# Patient Record
Sex: FEMALE | Race: WHITE | Employment: PART TIME | URBAN - METROPOLITAN AREA
[De-identification: names, ages, dates, MRNs, and addresses within clinical notes are randomized per-mention and may not be internally consistent; named-entity substitution may affect disease eponyms.]

---

## 2017-01-23 PROBLEM — E11.9 TYPE 2 DIABETES MELLITUS WITHOUT COMPLICATION, WITH LONG-TERM CURRENT USE OF INSULIN (HCC): Status: ACTIVE | Noted: 2017-01-23

## 2017-01-23 PROBLEM — Z79.4 TYPE 2 DIABETES MELLITUS WITHOUT COMPLICATION, WITH LONG-TERM CURRENT USE OF INSULIN (HCC): Status: ACTIVE | Noted: 2017-01-23

## 2019-03-13 ENCOUNTER — HOSPITAL ENCOUNTER (INPATIENT)
Facility: HOSPITAL | Age: 54
LOS: 2 days | Discharge: HOME | DRG: 065 | End: 2019-03-15
Attending: EMERGENCY MEDICINE | Admitting: HOSPITALIST
Payer: COMMERCIAL

## 2019-03-13 ENCOUNTER — APPOINTMENT (OUTPATIENT)
Dept: MRI IMAGING | Facility: HOSPITAL | Age: 54
DRG: 065 | End: 2019-03-13
Attending: NURSE PRACTITIONER
Payer: COMMERCIAL

## 2019-03-13 ENCOUNTER — APPOINTMENT (OUTPATIENT)
Dept: CT IMAGING | Age: 54
DRG: 065 | End: 2019-03-13
Attending: EMERGENCY MEDICINE
Payer: COMMERCIAL

## 2019-03-13 ENCOUNTER — APPOINTMENT (OUTPATIENT)
Dept: GENERAL RADIOLOGY | Age: 54
DRG: 065 | End: 2019-03-13
Attending: EMERGENCY MEDICINE
Payer: COMMERCIAL

## 2019-03-13 DIAGNOSIS — I63.9 ACUTE CVA (CEREBROVASCULAR ACCIDENT) (HCC): Primary | ICD-10-CM

## 2019-03-13 PROBLEM — E11.8 TYPE 2 DIABETES MELLITUS WITH COMPLICATION, WITHOUT LONG-TERM CURRENT USE OF INSULIN (HCC): Status: ACTIVE | Noted: 2017-01-23

## 2019-03-13 LAB
ALBUMIN SERPL-MCNC: 4.6 G/DL (ref 3.4–5)
ALBUMIN/GLOB SERPL: 1.2 {RATIO} (ref 1–2)
ALP LIVER SERPL-CCNC: 66 U/L (ref 41–108)
ALT SERPL-CCNC: 25 U/L (ref 13–56)
ANION GAP SERPL CALC-SCNC: 10 MMOL/L (ref 0–18)
AST SERPL-CCNC: 13 U/L (ref 15–37)
BASOPHILS # BLD AUTO: 0.04 X10(3) UL (ref 0–0.2)
BASOPHILS NFR BLD AUTO: 0.4 %
BILIRUB SERPL-MCNC: 1.1 MG/DL (ref 0.1–2)
BILIRUB UR QL STRIP.AUTO: NEGATIVE
BUN BLD-MCNC: 9 MG/DL (ref 7–18)
BUN/CREAT SERPL: 12.3 (ref 10–20)
CALCIUM BLD-MCNC: 9.7 MG/DL (ref 8.5–10.1)
CHLORIDE SERPL-SCNC: 104 MMOL/L (ref 98–107)
CHOLEST SMN-MCNC: 194 MG/DL (ref ?–200)
CLARITY UR REFRACT.AUTO: CLEAR
CO2 SERPL-SCNC: 25 MMOL/L (ref 21–32)
CREAT BLD-MCNC: 0.73 MG/DL (ref 0.55–1.02)
DEPRECATED RDW RBC AUTO: 42 FL (ref 35.1–46.3)
EOSINOPHIL # BLD AUTO: 0.07 X10(3) UL (ref 0–0.7)
EOSINOPHIL NFR BLD AUTO: 0.7 %
ERYTHROCYTE [DISTWIDTH] IN BLOOD BY AUTOMATED COUNT: 12.9 % (ref 11–15)
EST. AVERAGE GLUCOSE BLD GHB EST-MCNC: 140 MG/DL (ref 68–126)
GLOBULIN PLAS-MCNC: 3.9 G/DL (ref 2.8–4.4)
GLUCOSE BLD-MCNC: 147 MG/DL (ref 70–99)
GLUCOSE BLD-MCNC: 148 MG/DL (ref 70–99)
GLUCOSE UR STRIP.AUTO-MCNC: NEGATIVE MG/DL
HBA1C MFR BLD HPLC: 6.5 % (ref ?–5.7)
HCT VFR BLD AUTO: 49.2 % (ref 35–48)
HDLC SERPL-MCNC: 47 MG/DL (ref 40–59)
HGB BLD-MCNC: 16.2 G/DL (ref 12–16)
IMM GRANULOCYTES # BLD AUTO: 0.03 X10(3) UL (ref 0–1)
IMM GRANULOCYTES NFR BLD: 0.3 %
INR BLD: 0.97 (ref 0.9–1.1)
LDLC SERPL CALC-MCNC: 111 MG/DL (ref ?–100)
LYMPHOCYTES # BLD AUTO: 2.2 X10(3) UL (ref 1–4)
LYMPHOCYTES NFR BLD AUTO: 22.5 %
M PROTEIN MFR SERPL ELPH: 8.5 G/DL (ref 6.4–8.2)
MCH RBC QN AUTO: 29.3 PG (ref 26–34)
MCHC RBC AUTO-ENTMCNC: 32.9 G/DL (ref 31–37)
MCV RBC AUTO: 89.1 FL (ref 80–100)
MONOCYTES # BLD AUTO: 0.62 X10(3) UL (ref 0.1–1)
MONOCYTES NFR BLD AUTO: 6.3 %
NEUTROPHILS # BLD AUTO: 6.82 X10 (3) UL (ref 1.5–7.7)
NEUTROPHILS # BLD AUTO: 6.82 X10(3) UL (ref 1.5–7.7)
NEUTROPHILS NFR BLD AUTO: 69.8 %
NITRITE UR QL STRIP.AUTO: NEGATIVE
NONHDLC SERPL-MCNC: 147 MG/DL (ref ?–130)
OSMOLALITY SERPL CALC.SUM OF ELEC: 289 MOSM/KG (ref 275–295)
PH UR STRIP.AUTO: 6.5 [PH] (ref 4.5–8)
PLATELET # BLD AUTO: 196 10(3)UL (ref 150–450)
POTASSIUM SERPL-SCNC: 4 MMOL/L (ref 3.5–5.1)
PROT UR STRIP.AUTO-MCNC: NEGATIVE MG/DL
PSA SERPL DL<=0.01 NG/ML-MCNC: 13 SECONDS (ref 12.2–14.4)
RBC # BLD AUTO: 5.52 X10(6)UL (ref 3.8–5.3)
RBC UR QL AUTO: NEGATIVE
SODIUM SERPL-SCNC: 139 MMOL/L (ref 136–145)
SP GR UR STRIP.AUTO: <=1.005 (ref 1–1.03)
TRIGL SERPL-MCNC: 182 MG/DL (ref 30–149)
TROPONIN I SERPL-MCNC: <0.045 NG/ML (ref ?–0.04)
UROBILINOGEN UR STRIP.AUTO-MCNC: 0.2 MG/DL
VLDLC SERPL CALC-MCNC: 36 MG/DL (ref 0–30)
WBC # BLD AUTO: 9.8 X10(3) UL (ref 4–11)

## 2019-03-13 PROCEDURE — 71045 X-RAY EXAM CHEST 1 VIEW: CPT | Performed by: EMERGENCY MEDICINE

## 2019-03-13 PROCEDURE — 99291 CRITICAL CARE FIRST HOUR: CPT | Performed by: NURSE PRACTITIONER

## 2019-03-13 PROCEDURE — 70450 CT HEAD/BRAIN W/O DYE: CPT | Performed by: EMERGENCY MEDICINE

## 2019-03-13 PROCEDURE — 76498 UNLISTED MR PROCEDURE: CPT | Performed by: NURSE PRACTITIONER

## 2019-03-13 PROCEDURE — 70498 CT ANGIOGRAPHY NECK: CPT | Performed by: EMERGENCY MEDICINE

## 2019-03-13 PROCEDURE — 70496 CT ANGIOGRAPHY HEAD: CPT | Performed by: EMERGENCY MEDICINE

## 2019-03-13 PROCEDURE — 70551 MRI BRAIN STEM W/O DYE: CPT | Performed by: NURSE PRACTITIONER

## 2019-03-13 RX ORDER — ATORVASTATIN CALCIUM 40 MG/1
40 TABLET, FILM COATED ORAL NIGHTLY
Status: DISCONTINUED | OUTPATIENT
Start: 2019-03-14 | End: 2019-03-15

## 2019-03-13 RX ORDER — SODIUM CHLORIDE 9 MG/ML
INJECTION, SOLUTION INTRAVENOUS CONTINUOUS
Status: DISCONTINUED | OUTPATIENT
Start: 2019-03-13 | End: 2019-03-13

## 2019-03-13 RX ORDER — ASPIRIN 81 MG/1
324 TABLET, CHEWABLE ORAL ONCE
Status: COMPLETED | OUTPATIENT
Start: 2019-03-13 | End: 2019-03-13

## 2019-03-13 RX ORDER — METOCLOPRAMIDE HYDROCHLORIDE 5 MG/ML
10 INJECTION INTRAMUSCULAR; INTRAVENOUS EVERY 8 HOURS PRN
Status: DISCONTINUED | OUTPATIENT
Start: 2019-03-13 | End: 2019-03-15

## 2019-03-13 RX ORDER — PHENYLEPHRINE HCL IN 0.9% NACL 50MG/250ML
PLASTIC BAG, INJECTION (ML) INTRAVENOUS CONTINUOUS PRN
Status: DISCONTINUED | OUTPATIENT
Start: 2019-03-13 | End: 2019-03-15

## 2019-03-13 RX ORDER — DEXTROSE MONOHYDRATE 25 G/50ML
50 INJECTION, SOLUTION INTRAVENOUS
Status: DISCONTINUED | OUTPATIENT
Start: 2019-03-13 | End: 2019-03-15

## 2019-03-13 RX ORDER — ACETAMINOPHEN 325 MG/1
650 TABLET ORAL EVERY 4 HOURS PRN
Status: DISCONTINUED | OUTPATIENT
Start: 2019-03-13 | End: 2019-03-15

## 2019-03-13 RX ORDER — SODIUM PHOSPHATE, DIBASIC AND SODIUM PHOSPHATE, MONOBASIC 7; 19 G/133ML; G/133ML
1 ENEMA RECTAL ONCE AS NEEDED
Status: DISCONTINUED | OUTPATIENT
Start: 2019-03-13 | End: 2019-03-15

## 2019-03-13 RX ORDER — SENNOSIDES 8.6 MG
17.2 TABLET ORAL NIGHTLY
Status: DISCONTINUED | OUTPATIENT
Start: 2019-03-13 | End: 2019-03-15

## 2019-03-13 RX ORDER — ASPIRIN 81 MG/1
324 TABLET, CHEWABLE ORAL DAILY
Status: DISCONTINUED | OUTPATIENT
Start: 2019-03-14 | End: 2019-03-15

## 2019-03-13 RX ORDER — LORAZEPAM 2 MG/ML
0.5 INJECTION INTRAMUSCULAR ONCE
Status: COMPLETED | OUTPATIENT
Start: 2019-03-13 | End: 2019-03-13

## 2019-03-13 RX ORDER — ONDANSETRON 2 MG/ML
4 INJECTION INTRAMUSCULAR; INTRAVENOUS EVERY 6 HOURS PRN
Status: DISCONTINUED | OUTPATIENT
Start: 2019-03-13 | End: 2019-03-15

## 2019-03-13 RX ORDER — DOCUSATE SODIUM 100 MG/1
100 CAPSULE, LIQUID FILLED ORAL 2 TIMES DAILY
Status: DISCONTINUED | OUTPATIENT
Start: 2019-03-13 | End: 2019-03-15

## 2019-03-13 RX ORDER — FAMOTIDINE 20 MG/1
20 TABLET ORAL 2 TIMES DAILY
Status: DISCONTINUED | OUTPATIENT
Start: 2019-03-13 | End: 2019-03-14

## 2019-03-13 RX ORDER — BISACODYL 10 MG
10 SUPPOSITORY, RECTAL RECTAL
Status: DISCONTINUED | OUTPATIENT
Start: 2019-03-13 | End: 2019-03-15

## 2019-03-13 RX ORDER — ACETAMINOPHEN 650 MG/1
650 SUPPOSITORY RECTAL EVERY 4 HOURS PRN
Status: DISCONTINUED | OUTPATIENT
Start: 2019-03-13 | End: 2019-03-15

## 2019-03-13 RX ORDER — NICOTINE 21 MG/24HR
1 PATCH, TRANSDERMAL 24 HOURS TRANSDERMAL DAILY
Status: DISCONTINUED | OUTPATIENT
Start: 2019-03-13 | End: 2019-03-15

## 2019-03-13 RX ORDER — SODIUM CHLORIDE 9 MG/ML
INJECTION, SOLUTION INTRAVENOUS CONTINUOUS
Status: DISCONTINUED | OUTPATIENT
Start: 2019-03-13 | End: 2019-03-14

## 2019-03-13 RX ORDER — MORPHINE SULFATE 4 MG/ML
2 INJECTION, SOLUTION INTRAMUSCULAR; INTRAVENOUS EVERY 2 HOUR PRN
Status: DISCONTINUED | OUTPATIENT
Start: 2019-03-13 | End: 2019-03-15

## 2019-03-13 RX ORDER — ASPIRIN 81 MG/1
324 TABLET, CHEWABLE ORAL DAILY
Status: DISCONTINUED | OUTPATIENT
Start: 2019-03-14 | End: 2019-03-13

## 2019-03-13 RX ORDER — FAMOTIDINE 10 MG/ML
20 INJECTION, SOLUTION INTRAVENOUS 2 TIMES DAILY
Status: DISCONTINUED | OUTPATIENT
Start: 2019-03-13 | End: 2019-03-14

## 2019-03-13 RX ORDER — LABETALOL HYDROCHLORIDE 5 MG/ML
10 INJECTION, SOLUTION INTRAVENOUS ONCE
Status: COMPLETED | OUTPATIENT
Start: 2019-03-13 | End: 2019-03-13

## 2019-03-13 RX ORDER — MORPHINE SULFATE 4 MG/ML
1 INJECTION, SOLUTION INTRAMUSCULAR; INTRAVENOUS EVERY 2 HOUR PRN
Status: DISCONTINUED | OUTPATIENT
Start: 2019-03-13 | End: 2019-03-15

## 2019-03-13 RX ORDER — LABETALOL HYDROCHLORIDE 5 MG/ML
10 INJECTION, SOLUTION INTRAVENOUS EVERY 10 MIN PRN
Status: DISCONTINUED | OUTPATIENT
Start: 2019-03-13 | End: 2019-03-15

## 2019-03-13 RX ORDER — POLYETHYLENE GLYCOL 3350 17 G/17G
17 POWDER, FOR SOLUTION ORAL DAILY PRN
Status: DISCONTINUED | OUTPATIENT
Start: 2019-03-13 | End: 2019-03-15

## 2019-03-13 NOTE — ED PROVIDER NOTES
Patient transferred from [place] ED, where she presented as a stroke alert. However, the exact last normal time is unclear. Patient reports she has been having paresthesias in her right index, middle, ring fingers for about the last 2 weeks.   She has patent with flow appreciated in the left anterior cerebral, middle cerebral artery and posterior cerebral arteries. 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   Critical exam results phoned to Dr. Garima Maldonado on 3/13/2019 at 1740 hr with read

## 2019-03-13 NOTE — ED INITIAL ASSESSMENT (HPI)
C/o right hand numbness x2 weeks, right facial droop since approx 2pm   Patient daughter noted unsteady gait. Patient states she feels fine.  Denies dizziness

## 2019-03-13 NOTE — ED NOTES
Patient has arrived to [facility] ED at this time from [place] by EMS. Patient requesting to go to bathroom and and assisted. Ambulatory with assistance. Alert and appropriate.  Patient continues to have mild facial droop to right side with slight

## 2019-03-14 ENCOUNTER — APPOINTMENT (OUTPATIENT)
Dept: CV DIAGNOSTICS | Facility: HOSPITAL | Age: 54
DRG: 065 | End: 2019-03-14
Attending: NURSE PRACTITIONER
Payer: COMMERCIAL

## 2019-03-14 LAB
ANION GAP SERPL CALC-SCNC: 8 MMOL/L (ref 0–18)
APTT PPP: 28.1 SECONDS (ref 26.1–34.6)
ATRIAL RATE: 141 BPM
BASOPHILS # BLD AUTO: 0.03 X10(3) UL (ref 0–0.2)
BASOPHILS NFR BLD AUTO: 0.4 %
BUN BLD-MCNC: 7 MG/DL (ref 7–18)
BUN/CREAT SERPL: 14 (ref 10–20)
CALCIUM BLD-MCNC: 8.2 MG/DL (ref 8.5–10.1)
CHLORIDE SERPL-SCNC: 111 MMOL/L (ref 98–107)
CO2 SERPL-SCNC: 25 MMOL/L (ref 21–32)
CREAT BLD-MCNC: 0.5 MG/DL (ref 0.55–1.02)
DEPRECATED RDW RBC AUTO: 41.4 FL (ref 35.1–46.3)
EOSINOPHIL # BLD AUTO: 0.12 X10(3) UL (ref 0–0.7)
EOSINOPHIL NFR BLD AUTO: 1.6 %
ERYTHROCYTE [DISTWIDTH] IN BLOOD BY AUTOMATED COUNT: 12.7 % (ref 11–15)
GLUCOSE BLD-MCNC: 126 MG/DL (ref 70–99)
GLUCOSE BLD-MCNC: 131 MG/DL (ref 70–99)
GLUCOSE BLD-MCNC: 133 MG/DL (ref 70–99)
GLUCOSE BLD-MCNC: 134 MG/DL (ref 70–99)
GLUCOSE BLD-MCNC: 143 MG/DL (ref 70–99)
GLUCOSE BLD-MCNC: 150 MG/DL (ref 70–99)
HAV IGM SER QL: 1.9 MG/DL (ref 1.6–2.6)
HCT VFR BLD AUTO: 41.8 % (ref 35–48)
HGB BLD-MCNC: 14.3 G/DL (ref 12–16)
IMM GRANULOCYTES # BLD AUTO: 0.01 X10(3) UL (ref 0–1)
IMM GRANULOCYTES NFR BLD: 0.1 %
INR BLD: 1.06 (ref 0.9–1.1)
LYMPHOCYTES # BLD AUTO: 2.08 X10(3) UL (ref 1–4)
LYMPHOCYTES NFR BLD AUTO: 28.2 %
MCH RBC QN AUTO: 30.2 PG (ref 26–34)
MCHC RBC AUTO-ENTMCNC: 34.2 G/DL (ref 31–37)
MCV RBC AUTO: 88.2 FL (ref 80–100)
MONOCYTES # BLD AUTO: 0.53 X10(3) UL (ref 0.1–1)
MONOCYTES NFR BLD AUTO: 7.2 %
NEUTROPHILS # BLD AUTO: 4.6 X10 (3) UL (ref 1.5–7.7)
NEUTROPHILS # BLD AUTO: 4.6 X10(3) UL (ref 1.5–7.7)
NEUTROPHILS NFR BLD AUTO: 62.5 %
OSMOLALITY SERPL CALC.SUM OF ELEC: 298 MOSM/KG (ref 275–295)
P AXIS: 62 DEGREES
P-R INTERVAL: 136 MS
PHOSPHATE SERPL-MCNC: 3.6 MG/DL (ref 2.5–4.9)
PLATELET # BLD AUTO: 135 10(3)UL (ref 150–450)
POTASSIUM SERPL-SCNC: 3.6 MMOL/L (ref 3.5–5.1)
PSA SERPL DL<=0.01 NG/ML-MCNC: 14.3 SECONDS (ref 12.5–14.7)
Q-T INTERVAL: 282 MS
QRS DURATION: 78 MS
QTC CALCULATION (BEZET): 431 MS
R AXIS: 64 DEGREES
RBC # BLD AUTO: 4.74 X10(6)UL (ref 3.8–5.3)
SODIUM SERPL-SCNC: 144 MMOL/L (ref 136–145)
T AXIS: 30 DEGREES
VENTRICULAR RATE: 141 BPM
WBC # BLD AUTO: 7.4 X10(3) UL (ref 4–11)

## 2019-03-14 PROCEDURE — 93306 TTE W/DOPPLER COMPLETE: CPT | Performed by: NURSE PRACTITIONER

## 2019-03-14 PROCEDURE — 99291 CRITICAL CARE FIRST HOUR: CPT | Performed by: INTERNAL MEDICINE

## 2019-03-14 RX ORDER — BUSPIRONE HYDROCHLORIDE 5 MG/1
10 TABLET ORAL DAILY
Status: DISCONTINUED | OUTPATIENT
Start: 2019-03-14 | End: 2019-03-15

## 2019-03-14 RX ORDER — MAGNESIUM OXIDE 400 MG (241.3 MG MAGNESIUM) TABLET
3 TABLET NIGHTLY PRN
Status: DISCONTINUED | OUTPATIENT
Start: 2019-03-14 | End: 2019-03-15

## 2019-03-14 RX ORDER — ENOXAPARIN SODIUM 100 MG/ML
40 INJECTION SUBCUTANEOUS DAILY
Status: DISCONTINUED | OUTPATIENT
Start: 2019-03-14 | End: 2019-03-15

## 2019-03-14 NOTE — ED NOTES
Per Dr. Delphine Staples, instructed to administer oral aspirin dose despite slight facial droop and slight slurred speech. Patient reports she has been tolerating oral fluids since symptoms developed earlier this afternoon.  MD informed that dysphagia screen is aut

## 2019-03-14 NOTE — OCCUPATIONAL THERAPY NOTE
OCCUPATIONAL THERAPY QUICK EVALUATION - INPATIENT    Room Number: 2400/2139-P  Evaluation Date: 3/14/2019     Type of Evaluation: Initial and Quick Eval  Presenting Problem: CVA    Physician Order: IP Consult to Occupational Therapy  Reason for Therapy:  A granddaughter. Pt began experiencing RUE symptoms 2 weeks ago and thought she was having carpal tunnel issues from repetitive motion at work. Pt has been completing ADL with mod (I) with increased time or use of non-dominant LUE to complete.  Pt has had dif None    AM-PAC Score:  Score: 24  Approx Degree of Impairment: 0%  Standardized Score (AM-PAC Scale): 57.54  CMS Modifier (G-Code): CH    FUNCTIONAL TRANSFER ASSESSMENT  Supine to Sit : Independent  Sit to Stand: Independent    Skilled Therapy Provided: Pt dictates social work to be placed, this order has been placed. Results of the Sumeet 9 Hole Peg Test are 3 min and 7 seconds on Right and 31 seconds on Left. This patient is right hand dominant.  On three pegs via RUE pt utilized LUE to place peg in right following goals:  Patient able to toilet transfer: safely and independently  Patient/Caregiver able to demonstrate safety with ADLS: safely and independently

## 2019-03-14 NOTE — PROGRESS NOTES
03/14/19 0929   Clinical Encounter Type   Visited With Patient and family together   Sacramental Encounters   Sacrament of Sick-Anointing Anointed   The patient was seen by Duncan Lucas.  Received prayer, Scripture, support and Sacrament of t

## 2019-03-14 NOTE — PLAN OF CARE
Assume care 0730. Echo completed at bedside. A/ox4, has some very mild right facial droop, slight slurring with certain movements. Denies HA currently has mild one this morning. C/o N/T right fingers and fine motor skills slow.  Slight inward rotatio

## 2019-03-14 NOTE — PROGRESS NOTES
[employer]  Neurocritical Care APRN Progress Note    NAME: Eduardo Abdi - ROOM: 2844/0260-M - MRN: [id number] - Age: 53year old - :1965    History Of Present Illness:  Lucretia Avalos is a 53year old female with PMHx significa A comprehensive 10 point review of systems was completed. Pertinent positives and negatives noted in the HPI.       OBJECTIVE  Vitals:  /70   Pulse 92   Temp 98 °F (36.7 °C) (Temporal)   Resp 21   Ht 170.2 cm (5' 7\")   Wt 160 lb (72.6 kg)   SpO2 93% CONCLUSION:  No acute intracranial hemorrhage or localized mass effect. There is non-specific patchy decreased attenuation in the periventricular and subcortical white matter of the left cerebral hemisphere.   This may represent chronic small vessel ischem CONCLUSION:  No appreciable flow involving the proximal, mid and distal left internal carotid consistent with occlusion.   The left anterior communicating artery and left posterior communicating arteries are patent with flow appreciated in the left anterior Proph: -SCD  -Pepcid    Dispo:   -Full code    Plan of care discussed with Aguila Jiang, Dr. Austin Christianson. Plan of care also discussed with Neuro-Interventionalist, Dr. Burak Meyer.           MICHAEL Lemons  Critical Care Nurse Practitioner  Spectr

## 2019-03-14 NOTE — H&P
[facility] Hospitalist H&P       CC: Patient presents with:  Stroke (neurologic)       PCP: Shannon Low MD    History of Present Illness: Patient is a 53year old female with PMH sig for DM not on meds and current smoker is admitted with complaint of numbnes Alcohol/week: 5.4 oz      Types: 9 Standard drinks or equivalent per week      Comment: 3 VODKA DRINKS 3 DAYS PER WEEK       Fam Hx  Family History   Problem Relation Age of Onset   • Cancer Father         throat   • Other (Other) Mother         hypoglycem 3.6       Recent Labs   Lab  03/13/19   1650   ALT  25   AST  13*   ALB  4.6       Recent Labs   Lab  03/13/19   1650   TROP  <0.045       Additional Diagnostics: ECG: ST with PVCs    CXR: image personally reviewed normal    Radiology: Xr Chest Ap Portable No depressed calvarial fracture. CONCLUSION:  No acute intracranial hemorrhage or localized mass effect.   There is non-specific patchy decreased attenuation in the periventricular and subcortical white matter of the left cerebral hemispher demonstrates no significant difference in cerebral blood flow and cerebral blood volume mapping of the left cerebral hemisphere.   There is a large area of perfusion mismatch involving the left cerebral hemisphere on TTP and Tmax color mapping when correlat proximal left internal carotid to the MELISSA/MCA bifurcation. The anterior communicating artery is patent. There is diminutive flow in the left A1 branch of the anterior cerebral artery. There is flow in the left and right M1 and M2 branches.   Posterior co Genaro Eubanks MD               ASSESSMENT / PLAN:     Acute CVA  -stroke order set  -neuro/neuro crit care managing  -ASA, statin initiated (, TG already elevated, goal < 70 per neuro)  -permissive HTN pending further neuro recs - will need bp med

## 2019-03-14 NOTE — PROGRESS NOTES
NURSING ADMISSION NOTE      Patient admitted via Cart  Oriented to room. Safety precautions initiated. Bed in low position. Call light in reach.     Admission database completed   NIH 4 upon arrival  Patient has slurred speech, facial droop, and numb

## 2019-03-14 NOTE — ED NOTES
Report to Milena MARI at this time. Patient and family remain updated with results and plan for admission. Patient alert and appropriate. No distress observed. BP greatly improved.

## 2019-03-14 NOTE — CM/SW NOTE
03/14/19 1200   CM/SW Referral Data   Referral Source Physician   Reason for Referral Discharge planning;Protocol order set   Specify order set Stroke   Informant Patient; Children;Friend   Patient Info   Patient's Mental Status Alert;Oriented   Patient

## 2019-03-14 NOTE — PAYOR COMM NOTE
--------------  ADMISSION REVIEW     Payor: [address]  Subscriber #:  [id number]  Authorization Number: Pend    Admit date: 3/13/19  Admit time: 2105       Admitting Physician: Mayelin Lagos MD  Attending Physician:  Edwardo Lassiter, infarction cannot entirely be excluded in the appropriate clinical setting. MRI would be helpful for further characterization if clinical concern persists. Critical exam results phoned to Dr. Srinivas Miranda on 3/13/2019 at 1720 hr with read back.    Dictated by: Alla Prescott DO (Physician)           [facility] Hospitalist H&P       CC: Patient presents with:  Stroke (neurologic)       PCP: Elizabeth More MD    History of Present Illness: Patient is a 53year old female with PMH sig for DM not on meds and current smoker is admi lymph adenopathy, thyroid: no enlargment/tenderness/nodules appreciated   Lungs:   Clear to auscultation bilaterally. Normal effort   Chest wall:  No tenderness or deformity.    Heart:  Regular rate and rhythm, S1, S2 normal, no murmur, rub or gallop apprec (CPT=70450)  COMPARISON:  None. INDICATIONS:  numbness to left hand/left facial droop started today. TECHNIQUE:  Noncontrast CT scanning is performed through the brain. Dose reduction techniques were used.  Dose information is transmitted to the ACR (Radha brain including diffusion imaging and 3D time-of-flight MRA and perfusion imaging was performed following administration of 15 cc Dotarem.    FINDINGS:  Multiple foci of restricted diffusion are present throughout the left frontal and parietal lobes and pos Ct Stroke Cta Brain/cta Neck (w Iv)(cpt=70496/18993)    Result Date: 3/13/2019  PROCEDURE:  CT STROKE CTA BRAIN/CTA NECK (W IV)(CPT=70496/29823)  COMPARISON:  [facility], CT STROKE BRAIN (NO IV)(CPT=70450), 3/13/2019, 17:12.   INDICATIONS:  numbness to relation to the right with hypoplastic V4 segment  RIGHT INTERNAL CAROTID:   There is mild calcific plaque involving the proximal right internal carotid artery.   No significant stenosis or dissection COMMON CAROTID:   No significant stenosis or dissection Service number: [phone]        Electronically signed by Pierce Denver, DO on 3/14/2019  8:44 AM         MEDICATIONS ADMINISTERED IN LAST 1 DAY:  aspirin chewable tab 324 mg     Date Action Dose Route User    3/13/2019 1854 Given 324 mg Oral Norma, Aria

## 2019-03-14 NOTE — CONSULTS
[facility]  Neurocritical Care Consult Note    María Florinda Abdi Patient Status:  Inpatient    1965 MRN [id number]   [facility] 6NE-A Attending Nicholas Crouch,    Hosp Day # 1 PCP Kameron Alvarenga MD       Reason for Co Age of Onset   • Cancer Father         throat   • Other (Other) Mother         hypoglycemic, stroke       Current Meds:    Current Facility-Administered Medications:  busPIRone HCl (BUSPAR) tab 10 mg 10 mg Oral Daily   Enoxaparin Sodium (LOVENOX) 40 MG/0.4 times per day   nicotine (NICODERM CQ) 21 MG/24HR 1 patch 1 patch Transdermal Daily   atorvastatin (LIPITOR) tab 40 mg 40 mg Oral Nightly       Review of Systems:     Constitutional:    Denies unusual weight loss or weight gain, fever/chills or night sweat in the periventricular and subcortical white matter of the left cerebral hemisphere. This may represent chronic small vessel ischemic changes or demyelinating process. Acute infarction cannot entirely be excluded in the appropriate clinical setting.   MRI 03/13/19   1650  03/14/19   0420   NA  139  144   K  4.0  3.6   CL  104  111*   CO2  25.0  25.0   GLU  148*  134*   BUN  9  7   CREATSERUM  0.73  0.50*     Recent Labs   Lab  03/13/19   1650  03/14/19   0420   WBC  9.8  7.4   HGB  16.2*  14.3   PLT  196.0

## 2019-03-14 NOTE — PHYSICAL THERAPY NOTE
PHYSICAL THERAPY EVALUATION - INPATIENT     Room Number: 4309/3442-D  Evaluation Date: 3/14/2019  Type of Evaluation: Initial  Physician Order: PT Eval and Treat    Presenting Problem: Acute CVA, 3/13/19  Reason for Therapy: Mobility Dysfunction and Sitting: Fair +  Static Standing: Fair +  Dynamic Standing: Poor +    ADDITIONAL TESTS                                    NEUROLOGICAL FINDINGS                      ACTIVITY TOLERANCE           BP: 156/85  BP Location: Right arm     Patient Position: Sitti to room for another 100 feet with CGA for safety. Pt returned to [facility] with [address] [facility] staff. Exercise/Education Provided:  Functional activity tolerated  Gait training  Transfer training    Patient End of Session: Up in chair; With [address] staff;Needs met;Call light

## 2019-03-14 NOTE — SLP NOTE
ADULT SWALLOWING EVALUATION    ASSESSMENT    ASSESSMENT/OVERALL IMPRESSION:  Patient seen for swallowing assessment per stroke protocol. She did not pass RN dysphagia screen due to right facial droop. She is alert and eager to eat.   She reports her speec Regular; Thin liquids  Precautions: Aspiration    Patient/Family Goals:  To get better    SWALLOWING HISTORY  Current Diet Consistency: NPO  Dysphagia History: denied  Imaging Results:   MRI Brain from 3/13/19 revealed:  CONCLUSION:  Multi focal punctate are #2 The patient/family/caregiver will demonstrate understanding and implementation of aspiration precautions and swallow strategies independently over 1-2 session(s).     In Progress   Goal #3 Patient will participate in cognitive communication assessment  I

## 2019-03-14 NOTE — CONSULTS
[facility] Interventional Neuro Radiology Consult    Emigdio Abdi Patient Status:  Inpatient    1965 MRN [id number]   [facility] 6NE-A Attending Sheron Graves,    Hosp Day # 1 PCP Bryan Potter MD     [address]  with ambulation, coordination, or speech expression/comprehension.      The patient denies constitutional symptoms, such as fevers, chills, night sweats, weight loss, chest pain, shortness of breath, gastrointestinal or genitourinary symptoms    PAST MEDICA hydroxide (MILK OF MAGNESIA) 400 MG/5ML suspension 30 mL 30 mL Oral Daily PRN   bisacodyl (DULCOLAX) rectal suppository 10 mg 10 mg Rectal Daily PRN   FLEET ENEMA (FLEET) 7-19 GM/118ML enema 133 mL 1 enema Rectal Once PRN   ondansetron HCl (ZOFRAN) injecti is intact. Gait deferred. DIAGNOSTIC DATA: Lab Results   Component Value Date    WBC 7.4 03/14/2019    HGB 14.3 03/14/2019    HCT 41.8 03/14/2019    .0 03/14/2019    CREATSERUM 0.50 03/14/2019    BUN 7 03/14/2019     03/14/2019    K 3. left cerebral hemisphere on TTP and T max color mapping when correlated with cerebral blood volume and diffusion imaging.     ASSESSMENT:  Multiple acute infarcts to the Left hemisphere  Occlusion of left internal carotid artery    No prior antiplatelet or Discussed with MICHAEL Carranza  [facility]  Spectra [phone]  Pager [phone]  3/14/2019, 10:49 AM

## 2019-03-15 VITALS
BODY MASS INDEX: 28.41 KG/M2 | HEART RATE: 85 BPM | TEMPERATURE: 98 F | WEIGHT: 181 LBS | OXYGEN SATURATION: 98 % | SYSTOLIC BLOOD PRESSURE: 136 MMHG | RESPIRATION RATE: 21 BRPM | DIASTOLIC BLOOD PRESSURE: 68 MMHG | HEIGHT: 67 IN

## 2019-03-15 LAB
GLUCOSE BLD-MCNC: 122 MG/DL (ref 70–99)
GLUCOSE BLD-MCNC: 153 MG/DL (ref 70–99)
POTASSIUM SERPL-SCNC: 4.3 MMOL/L (ref 3.5–5.1)

## 2019-03-15 RX ORDER — ATORVASTATIN CALCIUM 40 MG/1
40 TABLET, FILM COATED ORAL NIGHTLY
Qty: 30 TABLET | Refills: 0 | Status: SHIPPED | OUTPATIENT
Start: 2019-03-15 | End: 2019-04-02

## 2019-03-15 RX ORDER — NICOTINE 21 MG/24HR
1 PATCH, TRANSDERMAL 24 HOURS TRANSDERMAL DAILY
Qty: 14 PATCH | Refills: 0 | Status: SHIPPED | OUTPATIENT
Start: 2019-03-16 | End: 2019-03-19

## 2019-03-15 RX ORDER — BUSPIRONE HYDROCHLORIDE 10 MG/1
10 TABLET ORAL DAILY
Qty: 30 TABLET | Refills: 0 | Status: SHIPPED | OUTPATIENT
Start: 2019-03-16 | End: 2019-03-19 | Stop reason: ALTCHOICE

## 2019-03-15 RX ORDER — ASPIRIN 81 MG/1
81 TABLET ORAL DAILY
Qty: 30 TABLET | Refills: 0 | Status: SHIPPED | OUTPATIENT
Start: 2019-03-15 | End: 2019-04-02

## 2019-03-15 NOTE — CONSULTS
[facility]  Neurology  Hospital Consultation Report    Hayden Abdi Patient Status:  Inpatient    1965 MRN [id number]   [facility] 7NE-A Attending Gwen Vergara DO   Hosp Day # 2 PCP Benigno Beth MD   Date of Admission: Q4H PRN   Or      acetaminophen (TYLENOL) 650 MG rectal suppository 650 mg 650 mg Rectal Q4H PRN   morphINE sulfate (PF) 4 MG/ML injection 1 mg 1 mg Intravenous Q2H PRN   Or      morphINE sulfate (PF) 4 MG/ML injection 2 mg 2 mg Intravenous Q2H PRN   Labet throat; hearing loss negative  LUNGS: denies shortness of breath with exertion, coughing or wheezing  CARDIOVASCULAR: denies chest pain on exertion; no palpitations  GI: denies abdominal pain, denies heartburn, denies vomiting, constipation,diarrhea,nausea  (H) 03/14/2019    CA 8.2 (L) 03/14/2019    ALB 4.6 03/13/2019    ALKPHO 66 03/13/2019    TP 8.5 (H) 03/13/2019    AST 13 (L) 03/13/2019    ALT 25 03/13/2019    PTT 28.1 03/14/2019    INR 1.06 03/14/2019    PTP 14.3 03/14/2019    MG 1.9 03/14/2019 Will Santana MD            Ct Stroke Cta Brain/cta Neck (w Iv)(cpt=70496/67547)    Result Date: 3/13/2019  CONCLUSION:  No appreciable flow involving the proximal, mid and distal left internal carotid consistent with occlusion.   The left anterior communicating a

## 2019-03-15 NOTE — DISCHARGE SUMMARY
General Medicine Discharge Summary     Patient ID:  Mattihas Abdi  53year old  1/20/1965    Admit date: 3/13/2019    Discharge date and time: 3/15/19    Attending Physician: DO Shai Marin return to [facility] care for her primary care. She has severe anxiety and this really limits her ability to find a compatible PCP.         Consults: IP CONSULT TO NEUROLOGY  IP CONSULT TO HOSPITALIST  IP CONSULT TO NEUROLOGY  IP CONSULT TO NEUROLOGY  IP CONSULT decreased attenuation in the periventricular and subcortical white matter of the left cerebral hemisphere which is nonspecific.   Low-attenuation foci in the basal ganglia bilaterally appears symmetric and is most suggestive of coarse prominent perivascular and right middle cerebral artery and posterior cerebral arteries  bilaterally. The posterior communicating arteries are visualized bilaterally. There is some diminutive flow in M3 branches of the left middle cerebral artery.   Diminutive caliber of the V using NASCET. Dose reduction techniques were used. Dose information is transmitted to the [facility] [employer] of Radiology) NRDR ([address]) which includes the Dose Index Registry.   PATIENT STATED HISTORY:(As transcribed by Huma Santiago posterior communicating arteries are patent with flow appreciated in the left anterior cerebral, middle cerebral artery and posterior cerebral arteries. 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   Critical exam results phoned to Dr. Tara Bolton

## 2019-03-15 NOTE — PLAN OF CARE
Assumed care at Doctor Emmett 91 oriented and awake, family at bedside  C/o numbness to right 4 fingers   Slight right sided weakness and facial droop noted  BP within parameters, vss, RA, NSR on tele  Plan of care discussed with patient and family members  Malik

## 2019-03-15 NOTE — PLAN OF CARE
NURSING DISCHARGE NOTE    Discharged Home via Wheelchair. Accompanied by Family member and Support staff  Belongings Returned to patient from safe. Cleared for discharge by all consults. Discharge AVS reviewed with patient and daughter.  Reviewed me

## 2019-03-15 NOTE — PLAN OF CARE
Patient received at 31 [phone] 62 from CCU in stable condition. Neurologically intact. A/Ox4, VSS. Right mild facial droop and mild slurring noted. Denies any pain. Tolerating diet. Slight drift to RUE. Pt and family updated on POC, verbalized understanding.  Will c

## 2019-03-15 NOTE — CONSULTS
Chart reviewed  NEMO from neurology service in ICU  S/P large vessel occlusion/stroke  Smoking, DM as risks  Primary risk reduction already started  ASA for secondary stroke prophylaxis  No additional imaging or testing anticipated at this time  Echo shows

## 2019-03-15 NOTE — PHYSICAL THERAPY NOTE
PHYSICAL THERAPY TREATMENT NOTE - INPATIENT    Room Number: 1473/6128-V     Session: 1   Number of Visits to Meet Established Goals: 5    Presenting Problem: Acute CVA, 3/13/19    History related to current admission:  Acute CVA of L ICA, 3/13/19, [facility] sig currently need. ..   -   Moving to and from a bed to a chair (including a wheelchair)?: None   -   Need to walk in hospital room?: None   -   Climbing 3-5 steps with a railing?: A Little       AM-PAC Score:  Raw Score: 23   Approx Degree of Impairment: 11. 2 tasks.  Educated pt and fiancee on importance of sleep as well in stroke recovery. Instructed in hep program for basic balance retraining. Patient End of Session: Up in chair;Needs met;Call light within reach;RN aware of session/findings; All patient

## 2019-03-15 NOTE — ED PROVIDER NOTES
Patient Seen in: [facility] 7ne-a    History   Patient presents with:  Stroke (neurologic)    Stated Complaint: numbness to righthand/right facial droop started today. HPI    This is a 58-year-old female presents with right-sided facial droop.   P vital signs reviewed. All other systems reviewed and negative except as noted above.     Physical Exam     ED Triage Vitals [03/13/19 1645]   BP (!) 192/89   Pulse (!) 138   Resp 20   Temp 98 °F (36.7 °C)   Temp src Temporal   SpO2 97 %   O2 Device Non CULTURE - Abnormal; Notable for the following components:    Bacteria Urine Rare (*)     Squamous Epi.  Cells Moderate (*)     Mucous Urine 1+ (*)     All other components within normal limits   LIPID PANEL - Abnormal; Notable for the following components: Abnormal; Notable for the following components:    .0 (*)     All other components within normal limits   TROPONIN I - Normal   PROTHROMBIN TIME (PT) - Normal   MAGNESIUM - Normal   PHOSPHORUS - Normal   PTT, ACTIVATED - Normal   PROTHROMBIN TIME (P periventricular and subcortical white matter of the left cerebral hemisphere. This may represent chronic small vessel ischemic changes or demyelinating process. Acute infarction cannot entirely be excluded in the appropriate clinical setting.   MRI would emergency department code stroke was called and the CTA showed no flow in the left internal carotid artery and some nonspecific area of decreased attenuation in the left periventricular and subcortical white matter. .  This patient was sent directly after h

## 2019-03-16 NOTE — PLAN OF CARE
Patient called today with questions about her discharge yesterday  Patient stated her blood pressure has been elevated today, SBP 150s  RN very anxious and questions about her discharge instructions.   RN reviewed with patient  RN advised patient to come to

## 2019-03-21 PROBLEM — I65.22 STENOSIS OF LEFT CAROTID ARTERY: Status: ACTIVE | Noted: 2019-03-21

## 2019-03-21 PROBLEM — I10 ESSENTIAL HYPERTENSION: Status: ACTIVE | Noted: 2019-03-21

## 2019-03-21 NOTE — PAYOR COMM NOTE
--------------  DISCHARGE REVIEW    Payor: [address] [facility]  Subscriber #:  [id number]  Authorization Number: [id number]    Admit date: 3/13/19  Admit time:  2105  Discharge Date: 3/15/2019  5:28 PM     Admitting Physician: Masood Segal

## 2019-03-22 ENCOUNTER — OFFICE VISIT (OUTPATIENT)
Dept: OCCUPATIONAL MEDICINE | Age: 54
End: 2019-03-22
Attending: Other
Payer: COMMERCIAL

## 2019-03-22 DIAGNOSIS — I63.9 ACUTE CVA (CEREBROVASCULAR ACCIDENT) (HCC): ICD-10-CM

## 2019-03-22 PROCEDURE — 97166 OT EVAL MOD COMPLEX 45 MIN: CPT

## 2019-03-22 PROCEDURE — 97110 THERAPEUTIC EXERCISES: CPT

## 2019-03-22 NOTE — PROGRESS NOTES
OCCUPATIONAL THERAPY UPPER EXTREMITY EVALUATION   Referring Physician: Dr. Jasmyne Low  Diagnosis: CVA, right hand weakness     Date of Service: 3/22/2019     PATIENT SUMMARY   Yari Cosby is a 53year old y/o female who presents to therapy today with complai consistent with   hypoplasia versus significant stenosis. Perfusion imaging demonstrates no significant difference in cerebral blood flow and cerebral blood volume mapping of the left cerebral hemisphere.   There is a large area of perfusion mismatch in while holding objets in the hand.    Proprioception testing appears to be intact, however pt states she often is dropping objects out of her hand  Stereognosis is intact      ROM: WNL TASHI UE     AROM/PROM:(Degrees)  Digit ROM is grossly [place] PEMBRO however ROM perf planning and for this course of care. Thank you for your referral. Please co-sign or sign and return this letter via fax as soon as possible to [phone].  If you have any questions, please contact me at Dept: [phone]    Sincerely,  Electronicall

## 2019-03-26 ENCOUNTER — OFFICE VISIT (OUTPATIENT)
Dept: OCCUPATIONAL MEDICINE | Age: 54
End: 2019-03-26
Attending: Other
Payer: COMMERCIAL

## 2019-03-26 DIAGNOSIS — I63.9 ACUTE CVA (CEREBROVASCULAR ACCIDENT) (HCC): ICD-10-CM

## 2019-03-26 PROCEDURE — 97110 THERAPEUTIC EXERCISES: CPT

## 2019-03-26 PROCEDURE — 97112 NEUROMUSCULAR REEDUCATION: CPT

## 2019-03-26 NOTE — PROGRESS NOTES
Dx: CVA         Authorized # of Visits:  Carlo Dinh MD visit: none scheduled  Fall Risk: standard         Precautions: n/a             Subjective: \"I did my eyeliner today, it doesn't look perfect but it's something. \"    Objective: Sensation teste Treatment: 45 min     Total Treatment Time: 45 min

## 2019-04-02 ENCOUNTER — OFFICE VISIT (OUTPATIENT)
Dept: OCCUPATIONAL MEDICINE | Age: 54
End: 2019-04-02
Attending: Other
Payer: COMMERCIAL

## 2019-04-02 DIAGNOSIS — I63.9 ACUTE CVA (CEREBROVASCULAR ACCIDENT) (HCC): ICD-10-CM

## 2019-04-02 PROBLEM — E66.3 OVERWEIGHT (BMI 25.0-29.9): Status: ACTIVE | Noted: 2019-04-02

## 2019-04-02 PROBLEM — F17.210 TOBACCO DEPENDENCE DUE TO CIGARETTES: Status: ACTIVE | Noted: 2019-04-02

## 2019-04-02 PROBLEM — F41.1 GENERALIZED ANXIETY DISORDER: Status: ACTIVE | Noted: 2019-04-02

## 2019-04-02 PROCEDURE — 97112 NEUROMUSCULAR REEDUCATION: CPT

## 2019-04-02 PROCEDURE — 97110 THERAPEUTIC EXERCISES: CPT

## 2019-04-02 NOTE — PROGRESS NOTES
Dx: CVA         Authorized # of Visits:  Lalo Goss 150         Next MD visit: none scheduled  Fall Risk: standard         Precautions: n/a             Subjective: \"Is being so tired a part of this? \"  \"I could snap the dog's collar yesterday. \"    Objective: Impr

## 2019-04-05 ENCOUNTER — OFFICE VISIT (OUTPATIENT)
Dept: OCCUPATIONAL MEDICINE | Age: 54
End: 2019-04-05
Attending: Other
Payer: COMMERCIAL

## 2019-04-05 DIAGNOSIS — I63.9 ACUTE CVA (CEREBROVASCULAR ACCIDENT) (HCC): ICD-10-CM

## 2019-04-05 PROCEDURE — 97110 THERAPEUTIC EXERCISES: CPT

## 2019-04-05 PROCEDURE — 97112 NEUROMUSCULAR REEDUCATION: CPT

## 2019-04-05 NOTE — PROGRESS NOTES
Dx: CVA         Authorized # of Visits:  David Jaimes         Next MD visit: none scheduled  Fall Risk: standard         Precautions: n/a             Subjective: \"I feel like I can do more things, I was able to put the key in the door.  I was able to put eyeliner Scooping tasks  Tying  In-hand manipulation with balls       Sensory re-education Perfection using pinch and in-hand manipulation Dealing/sorting cards        [employer] peg board                                            Skilled Services: [employer] in [place]

## 2019-04-09 ENCOUNTER — OFFICE VISIT (OUTPATIENT)
Dept: OCCUPATIONAL MEDICINE | Age: 54
End: 2019-04-09
Attending: Other
Payer: COMMERCIAL

## 2019-04-09 ENCOUNTER — OFFICE VISIT (OUTPATIENT)
Dept: PHYSICAL THERAPY | Age: 54
End: 2019-04-09
Attending: INTERNAL MEDICINE
Payer: COMMERCIAL

## 2019-04-09 DIAGNOSIS — I63.9 ACUTE CVA (CEREBROVASCULAR ACCIDENT) (HCC): ICD-10-CM

## 2019-04-09 PROCEDURE — 97530 THERAPEUTIC ACTIVITIES: CPT

## 2019-04-09 PROCEDURE — 97112 NEUROMUSCULAR REEDUCATION: CPT

## 2019-04-09 PROCEDURE — 97110 THERAPEUTIC EXERCISES: CPT

## 2019-04-09 PROCEDURE — 97161 PT EVAL LOW COMPLEX 20 MIN: CPT

## 2019-04-09 NOTE — PROGRESS NOTES
Dx: CVA         Authorized # of Visits:  Lalo Goss 150         Next MD visit: none scheduled  Fall Risk: standard         Precautions: n/a             Subjective: \"I feel like the hand is stronger, but it still feels numb. \"    Objective:  =40 lb      Assessme tasks  Tying  In-hand manipulation with balls 1 lb weighted ball task  -pass around  -ball toss  -arm swing      Sensory re-education Perfection using pinch and in-hand manipulation Dealing/sorting cards WB on table and on wall    -wall push up       [employer]

## 2019-04-09 NOTE — PROGRESS NOTES
LOWER EXTREMITY EVALUATION:   Referring Physician: Dr. Nestor Bhatia V  Diagnosis: Acute CVA with right sided wero     Date of Service: 4/9/2019     PATIENT SUMMARY   Maddi Menezes is a 53year old y/o female who presents to therapy today with complaints of r TherAct 1      Total Timed Treatment: 40 min     Total Treatment Time: 40 min     In agreement with FOTO score and clinical rationale, this evaluation involved Low Complexity decision making due to 1-2 personal factors/comorbidities}.     PLAN OF CARE:    N

## 2019-04-10 ENCOUNTER — HOSPITAL ENCOUNTER (OUTPATIENT)
Dept: MAMMOGRAPHY | Age: 54
Discharge: HOME | End: 2019-04-10
Attending: INTERNAL MEDICINE
Payer: COMMERCIAL

## 2019-04-10 DIAGNOSIS — Z12.31 VISIT FOR SCREENING MAMMOGRAM: ICD-10-CM

## 2019-04-10 PROCEDURE — 77067 SCR MAMMO BI INCL CAD: CPT | Performed by: INTERNAL MEDICINE

## 2019-04-10 PROCEDURE — 77063 BREAST TOMOSYNTHESIS BI: CPT | Performed by: INTERNAL MEDICINE

## 2019-04-12 ENCOUNTER — OFFICE VISIT (OUTPATIENT)
Dept: OCCUPATIONAL MEDICINE | Age: 54
End: 2019-04-12
Attending: Other
Payer: COMMERCIAL

## 2019-04-12 DIAGNOSIS — I63.9 ACUTE CVA (CEREBROVASCULAR ACCIDENT) (HCC): ICD-10-CM

## 2019-04-12 PROCEDURE — 97112 NEUROMUSCULAR REEDUCATION: CPT

## 2019-04-12 PROCEDURE — 97110 THERAPEUTIC EXERCISES: CPT

## 2019-04-12 NOTE — PROGRESS NOTES
Dx: CVA         Authorized # of Visits:  Annette Cui         Next MD visit: none scheduled  Fall Risk: standard         Precautions: n/a             Subjective: \"I had to call the ambulance yesterday because I thought my BP was high. But it was actually ok. \" attention     Pink putty exercises  -/reposition  -roll/tip pinch  -lateral pinch Pegboard using in-hand manipulation to place, gripper black level 1 to remove Roll/pinch blue foam cubes    Digit adduction with foam Reviewed digiflex exercises for home

## 2019-04-16 ENCOUNTER — OFFICE VISIT (OUTPATIENT)
Dept: OCCUPATIONAL MEDICINE | Age: 54
End: 2019-04-16
Attending: Other
Payer: COMMERCIAL

## 2019-04-16 DIAGNOSIS — I63.9 ACUTE CVA (CEREBROVASCULAR ACCIDENT) (HCC): ICD-10-CM

## 2019-04-16 PROCEDURE — 97110 THERAPEUTIC EXERCISES: CPT

## 2019-04-16 PROCEDURE — 97112 NEUROMUSCULAR REEDUCATION: CPT

## 2019-04-16 NOTE — PROGRESS NOTES
Dx: CVA         Authorized # of Visits:  Mindy Bustamante         Next MD visit: none scheduled  Fall Risk: standard         Precautions: n/a             Subjective: \"I made a rotisserie chicken yesterday and I was able to stick it on the thing. \"    Objective: = tasks with cards    Patterning task, divided attention Digit extension with rubber band  D1 RA and D5 abd x 20      Pink putty exercises  -/reposition  -roll/tip pinch  -lateral pinch Pegboard using in-hand manipulation to place, gripper black level 1

## 2019-04-23 ENCOUNTER — OFFICE VISIT (OUTPATIENT)
Dept: OCCUPATIONAL MEDICINE | Age: 54
End: 2019-04-23
Attending: Other
Payer: COMMERCIAL

## 2019-04-23 PROCEDURE — 97110 THERAPEUTIC EXERCISES: CPT

## 2019-04-23 NOTE — PROGRESS NOTES
Dx: CVA         Authorized # of Visits:  Carlo Dinh MD visit: none scheduled  Fall Risk: standard         Precautions: n/a             Subjective: Pt continues to state that she is often unaware where the digits are in space (ie.  Poking her in the e independently write out a check legibly by d/c. Will continue to upgrade goals PRN        Plan: Continue OT for [facility], strengthening, neuromuscular re-education.      Date: 3/26/2019  Tx#: 2/8 Date: 4/1/19  Tx#: 3/8 Date: 4/5/19  Tx#: 4/8 Date: 4/9/19  Tx#: Skilled Services: HEP upgrades in bold    Charges: Harshal 3   Total Timed Treatment: 45 min     Total Treatment Time: 45 min

## 2019-04-25 ENCOUNTER — HOSPITAL ENCOUNTER (OUTPATIENT)
Dept: ULTRASOUND IMAGING | Age: 54
Discharge: HOME | End: 2019-04-25
Attending: INTERNAL MEDICINE
Payer: COMMERCIAL

## 2019-04-25 ENCOUNTER — HOSPITAL ENCOUNTER (OUTPATIENT)
Dept: MAMMOGRAPHY | Age: 54
Discharge: HOME | End: 2019-04-25
Attending: INTERNAL MEDICINE
Payer: COMMERCIAL

## 2019-04-25 DIAGNOSIS — R92.2 INCONCLUSIVE MAMMOGRAM: ICD-10-CM

## 2019-04-25 PROCEDURE — 76642 ULTRASOUND BREAST LIMITED: CPT | Performed by: INTERNAL MEDICINE

## 2019-04-25 PROCEDURE — 77061 BREAST TOMOSYNTHESIS UNI: CPT | Performed by: INTERNAL MEDICINE

## 2019-04-25 PROCEDURE — 77065 DX MAMMO INCL CAD UNI: CPT | Performed by: INTERNAL MEDICINE

## 2019-04-26 ENCOUNTER — APPOINTMENT (OUTPATIENT)
Dept: OCCUPATIONAL MEDICINE | Age: 54
End: 2019-04-26
Attending: Other
Payer: COMMERCIAL

## 2019-04-30 ENCOUNTER — OFFICE VISIT (OUTPATIENT)
Dept: OCCUPATIONAL MEDICINE | Age: 54
End: 2019-04-30
Attending: Other
Payer: COMMERCIAL

## 2019-04-30 PROCEDURE — 97110 THERAPEUTIC EXERCISES: CPT

## 2019-04-30 NOTE — PROGRESS NOTES
Dx: CVA         Authorized # of Visits:  Charline Veliz         Next MD visit: none scheduled  Fall Risk: standard         Precautions: n/a              Progress Summary    Pt has attended 9, cancelled 1, and no shown 0 visits in Occupational Therapy.      Subjective demonstrated by improved 9 hole peg test score to at least 1 min for improved use while manipulating buttons, keys and coins by d/c: met   New: Pt will increase FMC as demonstrated by 9 hole peg test score to at least 35 sec. for improved use while manipul manipulation to place, gripper black level 1 to remove Roll/pinch blue foam cubes    Digit adduction with foam Reviewed digiflex exercises for home as pt obtained red digiflex Reviewed neural glides MCP hops to D5    D5 extension off tabletop    Mental reggie

## 2019-05-03 ENCOUNTER — OFFICE VISIT (OUTPATIENT)
Dept: OCCUPATIONAL MEDICINE | Age: 54
End: 2019-05-03
Attending: Other
Payer: COMMERCIAL

## 2019-05-03 PROBLEM — E11.9 CONTROLLED TYPE 2 DIABETES MELLITUS WITHOUT COMPLICATION, WITHOUT LONG-TERM CURRENT USE OF INSULIN (HCC): Status: ACTIVE | Noted: 2017-01-23

## 2019-05-03 PROCEDURE — 97110 THERAPEUTIC EXERCISES: CPT

## 2019-05-03 NOTE — PROGRESS NOTES
Dx: CVA         Authorized # of Visits:  [facility] - STEVE Dinh MD visit: none scheduled  Fall Risk: standard         Precautions: n/a              Progress Summary    Pt has attended 10, cancelled 1, and no shown 0 visits in Occupational Therapy.      Subjectiv MARQUIS Lujan/L              Date: 3/26/2019  Tx#: 2/8 Date: 4/1/19  Tx#: 3/8 Date: 4/5/19  Tx#: 4/8 Date: 4/9/19  Tx#: 5/8 Date: 4/12/19  Tx#: 6/8 Date: 4/16/19  Tx#: 7/8 Date: 4/23/19  Tx#: 8/8 4/30/19  Tx: 9 5/3/19  Tx: 10   Sensation testing with Ara Libman peel and remove tape Tip pinch velcro checkers, in-hand manipulation to replace   Sensory re-education Perfection using pinch and in-hand manipulation Dealing/sorting cards WB on table and on wall    -wall push up [facility] with building tasks Tape tearing and r

## 2019-05-07 ENCOUNTER — OFFICE VISIT (OUTPATIENT)
Dept: OCCUPATIONAL MEDICINE | Age: 54
End: 2019-05-07
Attending: Other
Payer: COMMERCIAL

## 2019-05-07 PROCEDURE — 97110 THERAPEUTIC EXERCISES: CPT

## 2019-05-07 NOTE — PROGRESS NOTES
Dx: CVA         Authorized # of Visits:  Carlo Dinh MD visit: none scheduled  Fall Risk: standard         Precautions: n/a              Progress Summary    Pt has attended 11, cancelled 1, and no shown 0 visits in Occupational Therapy.      Subjectiv therapist: JOSÉ Weldon              Date: 4/5/19  Tx#: 4/8 Date: 4/9/19  Tx#: 5/8 Date: 4/12/19  Tx#: 6/8 Date: 4/16/19  Tx#: 7/8 Date: 4/23/19  Tx#: 8/8 4/30/19  Tx: 9 5/3/19  Tx: 10 5/7/19  Tx: 11   Buttoning tasks/unbuttoning Air alphabet on table and on wall    -wall push up [facility] with building tasks Tape tearing and rolling  Tying rope behind back (as in tying an apron) Writing tasks    Scissor skills Light touch to separate tissues  folding

## 2019-05-10 ENCOUNTER — OFFICE VISIT (OUTPATIENT)
Dept: OCCUPATIONAL MEDICINE | Age: 54
End: 2019-05-10
Attending: Other
Payer: COMMERCIAL

## 2019-05-10 PROCEDURE — 97112 NEUROMUSCULAR REEDUCATION: CPT

## 2019-05-10 PROCEDURE — 97110 THERAPEUTIC EXERCISES: CPT

## 2019-05-10 NOTE — PROGRESS NOTES
Dx: CVA         Authorized # of Visits:  Lalo Goss 150         Next MD visit: none scheduled  Fall Risk: standard         Precautions: n/a              Progress Summary    Pt has attended 12, cancelled 1, and no shown 0 visits in Occupational Therapy.      Subjectiv Date: 4/12/19  Tx#: 6/8 Date: 4/16/19  Tx#: 7/8 Date: 4/23/19  Tx#: 8/8 4/30/19  Tx: 9 5/3/19  Tx: 10 5/7/19  Tx: 11 5/10/19  Tx: 12   Buttoning tasks/unbuttoning Air alphabet In-hand manipulation with dice Paperclip linking using yajaira coordination Digit ad to peel and remove tape Tip pinch velcro checkers, in-hand manipulation to replace Unscrewing/replacing nuts/bolts    Dealing/sorting cards WB on table and on wall    -wall [facility] up [facility] with building tasks Tape tearing and rolling  Tying rope behind back (a

## 2019-05-14 ENCOUNTER — OFFICE VISIT (OUTPATIENT)
Dept: OCCUPATIONAL MEDICINE | Age: 54
End: 2019-05-14
Attending: Other
Payer: COMMERCIAL

## 2019-05-14 PROCEDURE — 97112 NEUROMUSCULAR REEDUCATION: CPT

## 2019-05-14 PROCEDURE — 97110 THERAPEUTIC EXERCISES: CPT

## 2019-05-14 NOTE — PROGRESS NOTES
Dx: CVA         Authorized # of Visits:  [place]         Next MD visit: none scheduled  Fall Risk: standard         Precautions: n/a              Progress Summary    Pt has attended 12, cancelled 1, and no shown 0 visits in Occupational Therapy.      Subjectiv Date: 4/5/19  Tx#: 4/8 Date: 4/9/19  Tx#: 5/8 Date: 4/12/19  Tx#: 6/8 Date: 4/16/19  Tx#: 7/8 Date: 4/23/19  Tx#: 8/8 4/30/19  Tx: 9 5/3/19  Tx: 10 5/7/19  Tx: 11 5/10/19  Tx: 12 5/14/19  Tx: 13   Buttoning tasks/unbuttoning Air [facility] manipulation with balls 1 lb weighted ball task  -pass around  -ball toss  -arm swing Precision tasks with poker chips  Gripper black level 3 x 30  Gripper level 2 x 30 with index finger removed   Digit abd/add with yellow foam blocks Connect 4 for in-hand

## 2019-05-17 ENCOUNTER — OFFICE VISIT (OUTPATIENT)
Dept: OCCUPATIONAL MEDICINE | Age: 54
End: 2019-05-17
Attending: Other
Payer: COMMERCIAL

## 2019-05-17 PROCEDURE — 97110 THERAPEUTIC EXERCISES: CPT

## 2019-05-17 NOTE — PROGRESS NOTES
Dx: CVA         Authorized # of Visits:  [facility] - STEVE Dinh MD visit: none scheduled  Fall Risk: standard         Precautions: n/a              Progress Summary    Pt has attended 14, cancelled 1, and no shown 0 visits in Occupational Therapy.      Subjectiv for this course of care. Thank you for your referral. If you have any questions, please contact me at Dept: [phone].     Sincerely,  Electronically signed by therapist: JOSÉ Rubalcava              Date: 4/5/19  Tx#: 4/8 Date: 4/9/19  Tx#: placement          Finding items in beans Pegboard (3 peg) using in-hand manipulation to place, gripper black level 2 to remove Range n reach Paper folding and tearing Mental rehearsal activities Purdue pegboard for bilateral coordination, speed, [address], in-h

## 2019-05-21 ENCOUNTER — APPOINTMENT (OUTPATIENT)
Dept: OCCUPATIONAL MEDICINE | Age: 54
End: 2019-05-21
Attending: Other
Payer: COMMERCIAL

## 2019-05-24 ENCOUNTER — APPOINTMENT (OUTPATIENT)
Dept: OCCUPATIONAL MEDICINE | Age: 54
End: 2019-05-24
Attending: Other
Payer: COMMERCIAL

## 2019-05-28 ENCOUNTER — OFFICE VISIT (OUTPATIENT)
Dept: OCCUPATIONAL MEDICINE | Age: 54
End: 2019-05-28
Attending: Other
Payer: COMMERCIAL

## 2019-05-28 ENCOUNTER — APPOINTMENT (OUTPATIENT)
Dept: OCCUPATIONAL MEDICINE | Age: 54
End: 2019-05-28
Attending: Other
Payer: COMMERCIAL

## 2019-05-28 PROCEDURE — 97110 THERAPEUTIC EXERCISES: CPT

## 2019-05-28 NOTE — PROGRESS NOTES
Dx: CVA         Authorized # of Visits:  Carlo Dinh MD visit: none scheduled  Fall Risk: standard         Precautions: n/a              Progress Summary    Pt has attended 15, cancelled 1, and no shown 0 visits in Occupational Therapy.      Subjectiv referral. If you have any questions, please contact me at Dept: [phone].     Sincerely,  Electronically signed by therapist: JOSÉ Weldon              Date: 4/16/19  Tx#: 7/8 Date: 4/23/19  Tx#: 8/8 4/30/19  Tx: 9 5/3/19  Tx: 10 5/7/19  Tx In-hand manipulation with dice   Digit abd/add with yellow foam blocks Connect 4 for in-hand manipulation and pinch Pinching to peel and remove tape Tip pinch velcro checkers, in-hand manipulation to replace Unscrewing/replacing nuts/bolts  Digit isolation

## 2019-05-31 ENCOUNTER — OFFICE VISIT (OUTPATIENT)
Dept: OCCUPATIONAL MEDICINE | Age: 54
End: 2019-05-31
Attending: Other
Payer: COMMERCIAL

## 2019-05-31 ENCOUNTER — APPOINTMENT (OUTPATIENT)
Dept: OCCUPATIONAL MEDICINE | Age: 54
End: 2019-05-31
Attending: Other
Payer: COMMERCIAL

## 2019-05-31 PROCEDURE — 97110 THERAPEUTIC EXERCISES: CPT

## 2019-05-31 NOTE — PROGRESS NOTES
Dx: CVA         Authorized # of Visits:  Memo Dinh MD visit: none scheduled  Fall Risk: standard         Precautions: n/a             Discharge Summary    Pt has attended 16, cancelled 1, and no shown 0 visits in Occupational Therapy.      Subjectiv MARQUIS Lujan/L              Date: 4/16/19  Tx#: 7/8 Date: 4/23/19  Tx#: 8/8 4/30/19  Tx: 9 5/3/19  Tx: 10 5/7/19  Tx: 11 5/10/19  Tx: 12 5/14/19  Tx: 13 5/17/19  Tx: 14 5/28/19  Tx: 15 5/31/19  Tx: 16   Paperclip linking using yajaira coordination Digit addu dice Wall weight jayashree  12 lb bicep curl x 20  -24 lb x 20 scapular retract  -24 lb x 20 triceps ext       Digit abd/add with yellow foam blocks Connect 4 for in-hand manipulation and pinch Pinching to peel and remove tape Tip pinch velcro checkers, in-ha

## 2019-06-04 ENCOUNTER — OFFICE VISIT (OUTPATIENT)
Dept: PHYSICAL THERAPY | Age: 54
End: 2019-06-04
Attending: INTERNAL MEDICINE
Payer: COMMERCIAL

## 2019-06-04 ENCOUNTER — APPOINTMENT (OUTPATIENT)
Dept: OCCUPATIONAL MEDICINE | Age: 54
End: 2019-06-04
Attending: Other

## 2019-06-04 PROCEDURE — 97110 THERAPEUTIC EXERCISES: CPT

## 2019-06-04 NOTE — PROGRESS NOTES
Dx: Left CVA; Right Hemiparesis         Authorized # of Visits:  8 (HMO)         Next MD visit: none scheduled  Fall Risk: standard         Precautions: n/a             Subjective: States that her knees are bothering her.   Feels her manual dexterity has im

## 2019-06-11 ENCOUNTER — APPOINTMENT (OUTPATIENT)
Dept: PHYSICAL THERAPY | Age: 54
End: 2019-06-11
Attending: INTERNAL MEDICINE
Payer: COMMERCIAL

## 2019-06-14 ENCOUNTER — APPOINTMENT (OUTPATIENT)
Dept: PHYSICAL THERAPY | Age: 54
End: 2019-06-14
Attending: INTERNAL MEDICINE
Payer: COMMERCIAL

## 2020-06-25 PROBLEM — Z86.73 H/O: CVA (CEREBROVASCULAR ACCIDENT): Status: ACTIVE | Noted: 2020-06-25

## 2020-10-29 PROBLEM — E66.09 CLASS 1 OBESITY DUE TO EXCESS CALORIES WITH SERIOUS COMORBIDITY AND BODY MASS INDEX (BMI) OF 34.0 TO 34.9 IN ADULT: Status: ACTIVE | Noted: 2020-10-29

## 2020-10-29 PROBLEM — E66.811 CLASS 1 OBESITY DUE TO EXCESS CALORIES WITH SERIOUS COMORBIDITY AND BODY MASS INDEX (BMI) OF 34.0 TO 34.9 IN ADULT: Status: ACTIVE | Noted: 2020-10-29

## 2020-11-02 RX ORDER — LEVOTHYROXINE SODIUM 0.03 MG/1
25 TABLET ORAL
Qty: 30 TABLET | Refills: 1 | Status: SHIPPED | OUTPATIENT
Start: 2020-11-02 | End: 2020-12-28

## 2023-08-05 DIAGNOSIS — E11.9 CONTROLLED TYPE 2 DIABETES MELLITUS WITHOUT COMPLICATION, WITHOUT LONG-TERM CURRENT USE OF INSULIN (HCC): ICD-10-CM

## 2023-08-07 DIAGNOSIS — E11.9 CONTROLLED TYPE 2 DIABETES MELLITUS WITHOUT COMPLICATION, WITHOUT LONG-TERM CURRENT USE OF INSULIN (HCC): ICD-10-CM

## 2023-08-07 RX ORDER — TIRZEPATIDE 2.5 MG/.5ML
2.5 INJECTION, SOLUTION SUBCUTANEOUS WEEKLY
Qty: 2 ML | Refills: 0 | Status: SHIPPED | OUTPATIENT
Start: 2023-08-07

## 2023-08-07 RX ORDER — TIRZEPATIDE 2.5 MG/.5ML
2.5 INJECTION, SOLUTION SUBCUTANEOUS WEEKLY
Qty: 2 ML | Refills: 0 | OUTPATIENT
Start: 2023-08-07

## 2023-09-05 DIAGNOSIS — E11.9 CONTROLLED TYPE 2 DIABETES MELLITUS WITHOUT COMPLICATION, WITHOUT LONG-TERM CURRENT USE OF INSULIN (HCC): ICD-10-CM

## 2023-09-06 RX ORDER — TIRZEPATIDE 2.5 MG/.5ML
2.5 INJECTION, SOLUTION SUBCUTANEOUS WEEKLY
Qty: 2 ML | Refills: 0 | OUTPATIENT
Start: 2023-09-06

## 2024-06-29 DIAGNOSIS — I10 ESSENTIAL HYPERTENSION: ICD-10-CM

## 2024-06-29 RX ORDER — NEBIVOLOL 20 MG/1
20 TABLET ORAL DAILY
Qty: 30 TABLET | Refills: 2 | Status: CANCELLED | OUTPATIENT
Start: 2024-06-29

## 2024-11-19 DIAGNOSIS — E78.2 MIXED HYPERLIPIDEMIA: ICD-10-CM

## 2024-11-19 DIAGNOSIS — I65.22 STENOSIS OF LEFT CAROTID ARTERY: ICD-10-CM

## 2024-11-19 DIAGNOSIS — I63.9 ACUTE CVA (CEREBROVASCULAR ACCIDENT) (HCC): ICD-10-CM

## 2024-11-19 RX ORDER — ATORVASTATIN CALCIUM 40 MG/1
40 TABLET, FILM COATED ORAL NIGHTLY
Qty: 30 TABLET | Refills: 0 | OUTPATIENT
Start: 2024-11-19

## (undated) NOTE — LETTER
Patient Name: Roula Abdi  YOB: 1965          MRN number:  [id number]  Date:  4/9/2019  Referring Physician:  Chichi Carbone V          LOWER EXTREMITY EVALUATION:    Referring Physician: Dr. Yina Childs V  Diagnosis: Acute CVA with right s stage of condition, and subjective/tissue reactivity.    Patient was instructed in and issued a HEP for neural tension of the median and ulnar nerve   Charges: PT Eval Low Complexity, TherAct 1      Total Timed Treatment: 40 min     Total Treatment Time: 40

## (undated) NOTE — LETTER
Patient Name: Harry Abdi  YOB: 1965          MRN number:  [id number]  Date:  4/30/2019  Referring Physician:  Hattie     Progress Summary    Pt has attended 9, cancelled 1, and no shown 0 visits in Occupational Therapy.      Subjective: Pt will increase [address] as demonstrated by improved 9 hole peg test score to at least 1 min for improved use while manipulating buttons, keys and coins by d/c: met   New: Pt will increase FMC as demonstrated by 9 hole peg test score to at least 35 sec. for im

## (undated) NOTE — LETTER
Patient Name: Cari Abdi  YOB: 1965          MRN number:  [id number]  Date:  5/31/2019  Referring Physician:  Vinod Rader    Discharge Summary  Initial Functional Outcome Score 50/100  Final Functional Outcome Score 70/100  Number of Visits for this course of care.     Thank you for your referral. If you have any questions, please contact me at Dept: [phone].     Sincerely,  Electronically signed by therapist: MARQUIS Grijalva/GENIE